# Patient Record
Sex: FEMALE | Race: WHITE | NOT HISPANIC OR LATINO
[De-identification: names, ages, dates, MRNs, and addresses within clinical notes are randomized per-mention and may not be internally consistent; named-entity substitution may affect disease eponyms.]

---
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
## 2023-05-15 ENCOUNTER — RX RENEWAL (OUTPATIENT)
Age: 18
End: 2023-05-15

## 2023-06-13 ENCOUNTER — APPOINTMENT (OUTPATIENT)
Dept: INTERNAL MEDICINE | Facility: CLINIC | Age: 18
End: 2023-06-13
Payer: COMMERCIAL

## 2023-06-13 VITALS
DIASTOLIC BLOOD PRESSURE: 78 MMHG | WEIGHT: 132 LBS | OXYGEN SATURATION: 99 % | HEIGHT: 66 IN | BODY MASS INDEX: 21.21 KG/M2 | SYSTOLIC BLOOD PRESSURE: 100 MMHG | HEART RATE: 61 BPM | TEMPERATURE: 98.5 F

## 2023-06-13 PROCEDURE — 99385 PREV VISIT NEW AGE 18-39: CPT

## 2023-06-13 NOTE — HISTORY OF PRESENT ILLNESS
[FreeTextEntry1] : Patient comes in to establish care and annual exam.\par \par  [de-identified] : TIMOTHY PAULINO is a 18 year F who comes in to establish care.\par Pt with hx of aquired hypothyroidism, amenorrhea, right breast fibroadenoma.\par Pt follows up with endocrine, breast surgery and will establish with Gyn. \par PT notes a hx of anxiety from freshman year of college. She also notes nausea and abdominal pain in 2021 and had bw done with Dr.Andrea Ramos for celiac that was positive and upper endoscopy done at that time was negative for celiac disease.  She notes recently she has noticed again nausea again with no abdominal pain but increased gas associated after eating certain foods.  She did see ENT a few months ago for jaw pain and had laryngoscopy which did show reflux and was advised to avoid certain foods.\par She does have family history maternal breast cancer at age 36 and follows regularly with breast surgery.  Patient states last breast ultrasound was done in the office with breast surgery last year.\par She had blood work done with endocrinology last year which showed elevated TSH and her levothyroxine was switched from 88 to 100 mcg and will see them again next month.\par Patient also notes inflamed lymph node behind the left ear and left side of the neck that occurs on and off.  Resolved, last occurred 2 weeks ago.\par Patient denies any cp, sob,abdominal pain, nausea, vomiting, palpitations, fever, chills, constipation, diarrhea.\par

## 2023-06-13 NOTE — ASSESSMENT
[FreeTextEntry1] : 1.health maintenance: Follow-up with GYN for initial Pap smear.\par Discussed blood work to obtain.\par Patient counseled regarding recommendations for vaccines, diet and exercise and all preventative screening.\par \par 2.acquired hypothyroidism: Recheck TFTs and follow-up with endocrinology regarding results and medication management.  Continue on levothyroxine 100 mcg on an empty stomach once daily.\par \par 3.fibroadenoma of the right breast: With family history of breast cancer, follow-up with breast surgery for repeat imaging.\par \par 4.vitamin D deficiency: Continue on vitamin D 1000 units daily, patient states she takes it infrequently.  Patient recently vegetarian we will check vitamin B12 level 2.\par \par 5.nausea: Discussed likely related to her acid reflux, start on Pepcid as needed, if no improvement of symptoms will need to go back to see GI.  Per patient upper endoscopy done in 2021.  Advised to keep GERD diet as well.

## 2023-06-13 NOTE — HEALTH RISK ASSESSMENT
[No] : In the past 12 months have you used drugs other than those required for medical reasons? No [0] : 2) Feeling down, depressed, or hopeless: Not at all (0) [Never] : Never [PHQ-2 Negative - No further assessment needed] : PHQ-2 Negative - No further assessment needed [I have developed a follow-up plan documented below in the note.] : I have developed a follow-up plan documented below in the note. [[id number]] : 0 [EyeExamDate] : 0120

## 2023-06-26 LAB
25(OH)D3 SERPL-MCNC: 31.7 NG/ML
ALBUMIN SERPL ELPH-MCNC: 4.8 G/DL
ALP BLD-CCNC: 48 U/L
ALT SERPL-CCNC: 9 U/L
ANION GAP SERPL CALC-SCNC: 12 MMOL/L
AST SERPL-CCNC: 15 U/L
BILIRUB SERPL-MCNC: 0.6 MG/DL
BUN SERPL-MCNC: 8 MG/DL
CALCIUM SERPL-MCNC: 10 MG/DL
CHLORIDE SERPL-SCNC: 102 MMOL/L
CO2 SERPL-SCNC: 24 MMOL/L
CREAT SERPL-MCNC: 0.69 MG/DL
EGFR: 127 ML/MIN/1.73M2
GLUCOSE SERPL-MCNC: 91 MG/DL
POTASSIUM SERPL-SCNC: 4.7 MMOL/L
PROT SERPL-MCNC: 7.3 G/DL
SODIUM SERPL-SCNC: 138 MMOL/L
T4 FREE SERPL-MCNC: 1.4 NG/DL
TSH SERPL-ACNC: 1.82 UIU/ML
VIT B12 SERPL-MCNC: 208 PG/ML

## 2023-07-03 ENCOUNTER — APPOINTMENT (OUTPATIENT)
Dept: ENDOCRINOLOGY | Facility: CLINIC | Age: 18
End: 2023-07-03

## 2023-07-11 ENCOUNTER — APPOINTMENT (OUTPATIENT)
Dept: BREAST CENTER | Facility: CLINIC | Age: 18
End: 2023-07-11
Payer: COMMERCIAL

## 2023-07-11 VITALS
HEIGHT: 66 IN | SYSTOLIC BLOOD PRESSURE: 99 MMHG | DIASTOLIC BLOOD PRESSURE: 64 MMHG | BODY MASS INDEX: 21.21 KG/M2 | WEIGHT: 132 LBS | HEART RATE: 67 BPM

## 2023-07-11 PROCEDURE — 99213 OFFICE O/P EST LOW 20 MIN: CPT

## 2023-07-11 NOTE — HISTORY OF PRESENT ILLNESS
[FreeTextEntry1] : This is a 18 year old  female who felt a lump in her right breast in 9/2021.  A core biopsy confirmed a fibroadenoma.  She no longer feels the lump.\par \par She is here with her mother.

## 2023-07-11 NOTE — PHYSICAL EXAM
[Normocephalic] : normocephalic [EOMI] : extra ocular movement intact [Sclera nonicteric] : sclera nonicteric [Supple] : supple [No Supraclavicular Adenopathy] : no supraclavicular adenopathy [Examined in the supine and seated position] : examined in the supine and seated position [No dominant masses] : no dominant masses in right breast  [No dominant masses] : no dominant masses left breast [No Nipple Retraction] : no left nipple retraction [No Nipple Discharge] : no left nipple discharge [No Axillary Lymphadenopathy] : no left axillary lymphadenopathy [Soft] : abdomen soft [Not Tender] : non-tender [de-identified] : Previously 1 cm mobile mass 10:00 N6 no longer palpable

## 2023-07-11 NOTE — DATA REVIEWED
[FreeTextEntry1] : Bilateral breast ultrasound ([facility]) 11/22/2021;  Right 10N7 1.3 cm hypoechoic nodule.  Follow-up in 6 months.

## 2023-07-25 ENCOUNTER — NON-APPOINTMENT (OUTPATIENT)
Age: 18
End: 2023-07-25

## 2023-07-28 ENCOUNTER — APPOINTMENT (OUTPATIENT)
Dept: OBGYN | Facility: CLINIC | Age: 18
End: 2023-07-28
Payer: COMMERCIAL

## 2023-07-28 VITALS
SYSTOLIC BLOOD PRESSURE: 108 MMHG | DIASTOLIC BLOOD PRESSURE: 68 MMHG | BODY MASS INDEX: 21.21 KG/M2 | HEIGHT: 66 IN | WEIGHT: 132 LBS

## 2023-07-28 DIAGNOSIS — N94.6 DYSMENORRHEA, UNSPECIFIED: ICD-10-CM

## 2023-07-28 PROCEDURE — 99385 PREV VISIT NEW AGE 18-39: CPT

## 2023-07-28 NOTE — PHYSICAL EXAM
[Appropriately responsive] : appropriately responsive [Alert] : alert [No Acute Distress] : no acute distress [No Lymphadenopathy] : no lymphadenopathy [Soft] : soft [Non-tender] : non-tender [Non-distended] : non-distended [No HSM] : No HSM [No Lesions] : no lesions [No Mass] : no mass [Oriented x3] : oriented x3 [FreeTextEntry6] : Fibroadenoma is not palpable.No adenopathy [Examination Of The Breasts] : a normal appearance [Normal] : normal [No Masses] : no breast masses were palpable

## 2023-07-28 NOTE — DISCUSSION/SUMMARY
[FreeTextEntry1] : Pap due age 18\par Gardasil in teens\par \par Acne/Dysmenorrhea and h/o ovarian cyst\par - pt has been using low dose OCP for these indications\par -careful discussion of long and shorterm risks d/w pt\par -Amanda as fourth generation progesterone has slightly higher risk of VTE\par -unsure if OCP has increase risk of fibroadenoma.  If such, would suggest trial off pill \par -I do not believe that she will have frequent recurrent cysts. Even this 4.8 cm cyst was incidental\par -non smoker, so I did renvew Amanda for 6 months\par \par Will reevaluate in 6 months

## 2023-07-28 NOTE — HISTORY OF PRESENT ILLNESS
[FreeTextEntry1] : 19 yo G0 for gyn care\par \par Breast biopsy next week for increasing fibroadenoma\par \par Pt has had OCP use since 2010 when she had a 4.8 cm ovarian cyst noted on CT at time of appy.\par Repeat US 10/22 showed resolution.\par Pt continued form lo loestrin to Amanda generic to Amanda. She has been on OCP for 18 months\par \par GYN:\par not sexually active\par \par SH:\par single, going to [employer], rising zohreh\par \par

## 2023-08-01 ENCOUNTER — NON-APPOINTMENT (OUTPATIENT)
Age: 18
End: 2023-08-01

## 2023-08-02 ENCOUNTER — RESULT REVIEW (OUTPATIENT)
Age: 18
End: 2023-08-02

## 2023-08-02 ENCOUNTER — APPOINTMENT (OUTPATIENT)
Dept: ULTRASOUND IMAGING | Facility: CLINIC | Age: 18
End: 2023-08-02
Payer: COMMERCIAL

## 2023-08-02 ENCOUNTER — OUTPATIENT (OUTPATIENT)
Dept: OUTPATIENT SERVICES | Facility: HOSPITAL | Age: 18
LOS: 1 days | End: 2023-08-02
Payer: COMMERCIAL

## 2023-08-02 DIAGNOSIS — N63.10 UNSPECIFIED LUMP IN THE RIGHT BREAST, UNSPECIFIED QUADRANT: ICD-10-CM

## 2023-08-02 DIAGNOSIS — Z90.49 ACQUIRED ABSENCE OF OTHER SPECIFIED PARTS OF DIGESTIVE TRACT: Chronic | ICD-10-CM

## 2023-08-02 PROCEDURE — 19285 PERQ DEV BREAST 1ST US IMAG: CPT | Mod: RT

## 2023-08-02 PROCEDURE — 19286 PERQ DEV BREAST ADD US IMAG: CPT

## 2023-08-02 PROCEDURE — 19286 PERQ DEV BREAST ADD US IMAG: CPT | Mod: RT

## 2023-08-02 PROCEDURE — 19285 PERQ DEV BREAST 1ST US IMAG: CPT

## 2023-08-02 RX ORDER — LEVOTHYROXINE SODIUM 125 MCG
1 TABLET ORAL
Qty: 0 | Refills: 0 | DISCHARGE

## 2023-08-02 NOTE — ASU PATIENT PROFILE, ADULT - FALL HARM RISK - UNIVERSAL INTERVENTIONS
Bed in lowest position, wheels locked, appropriate side rails in place/Call bell, personal items and telephone in reach/Instruct patient to call for assistance before getting out of bed or chair/Non-slip footwear when patient is out of bed/[place] to call system/Physically safe environment - no spills, clutter or unnecessary equipment/Purposeful Proactive Rounding/Room/bathroom lighting operational, light cord in reach

## 2023-08-03 ENCOUNTER — APPOINTMENT (OUTPATIENT)
Dept: BREAST CENTER | Facility: HOSPITAL | Age: 18
End: 2023-08-03

## 2023-08-03 ENCOUNTER — RESULT REVIEW (OUTPATIENT)
Age: 18
End: 2023-08-03

## 2023-08-03 ENCOUNTER — OUTPATIENT (OUTPATIENT)
Dept: INPATIENT UNIT | Facility: HOSPITAL | Age: 18
LOS: 1 days | Discharge: HOME | End: 2023-08-03
Payer: COMMERCIAL

## 2023-08-03 ENCOUNTER — TRANSCRIPTION ENCOUNTER (OUTPATIENT)
Age: 18
End: 2023-08-03

## 2023-08-03 VITALS
HEIGHT: 66 IN | SYSTOLIC BLOOD PRESSURE: 101 MMHG | DIASTOLIC BLOOD PRESSURE: 60 MMHG | TEMPERATURE: 99 F | RESPIRATION RATE: 16 BRPM | OXYGEN SATURATION: 100 % | WEIGHT: 132.06 LBS | HEART RATE: 67 BPM

## 2023-08-03 VITALS
TEMPERATURE: 97 F | HEART RATE: 90 BPM | SYSTOLIC BLOOD PRESSURE: 103 MMHG | RESPIRATION RATE: 16 BRPM | OXYGEN SATURATION: 100 % | DIASTOLIC BLOOD PRESSURE: 66 MMHG

## 2023-08-03 DIAGNOSIS — Z90.49 ACQUIRED ABSENCE OF OTHER SPECIFIED PARTS OF DIGESTIVE TRACT: Chronic | ICD-10-CM

## 2023-08-03 DIAGNOSIS — G89.18 OTHER ACUTE POSTPROCEDURAL PAIN: ICD-10-CM

## 2023-08-03 DIAGNOSIS — N63.10 UNSPECIFIED LUMP IN THE RIGHT BREAST, UNSPECIFIED QUADRANT: ICD-10-CM

## 2023-08-03 LAB — HCG UR QL: NEGATIVE — SIGNIFICANT CHANGE UP

## 2023-08-03 PROCEDURE — 19126 EXCISION ADDL BREAST LESION: CPT

## 2023-08-03 PROCEDURE — 19125 EXCISION BREAST LESION: CPT | Mod: RT

## 2023-08-03 PROCEDURE — 76098 X-RAY EXAM SURGICAL SPECIMEN: CPT

## 2023-08-03 PROCEDURE — 76098 X-RAY EXAM SURGICAL SPECIMEN: CPT | Mod: 26,76

## 2023-08-03 PROCEDURE — 88305 TISSUE EXAM BY PATHOLOGIST: CPT

## 2023-08-03 PROCEDURE — 81025 URINE PREGNANCY TEST: CPT

## 2023-08-03 PROCEDURE — 88305 TISSUE EXAM BY PATHOLOGIST: CPT | Mod: 26

## 2023-08-03 RX ORDER — DROSPIRENONE AND ETHINYL ESTRADIOL 0.03MG-3MG
1 KIT ORAL
Refills: 0 | DISCHARGE

## 2023-08-03 RX ORDER — HYDROMORPHONE HYDROCHLORIDE 2 MG/ML
0.5 INJECTION INTRAMUSCULAR; INTRAVENOUS; SUBCUTANEOUS
Refills: 0 | Status: DISCONTINUED | OUTPATIENT
Start: 2023-08-03 | End: 2023-08-03

## 2023-08-03 RX ORDER — ONDANSETRON 8 MG/1
4 TABLET, FILM COATED ORAL ONCE
Refills: 0 | Status: COMPLETED | OUTPATIENT
Start: 2023-08-03 | End: 2023-08-03

## 2023-08-03 RX ORDER — SODIUM CHLORIDE 9 MG/ML
1000 INJECTION, SOLUTION INTRAVENOUS
Refills: 0 | Status: DISCONTINUED | OUTPATIENT
Start: 2023-08-03 | End: 2023-08-03

## 2023-08-03 RX ORDER — LEVOTHYROXINE SODIUM 125 MCG
1 TABLET ORAL
Refills: 0 | DISCHARGE

## 2023-08-03 RX ORDER — OXYCODONE HYDROCHLORIDE 5 MG/1
5 TABLET ORAL ONCE
Refills: 0 | Status: DISCONTINUED | OUTPATIENT
Start: 2023-08-03 | End: 2023-08-03

## 2023-08-03 RX ADMIN — ONDANSETRON 4 MILLIGRAM(S): 8 TABLET, FILM COATED ORAL at 19:04

## 2023-08-03 RX ADMIN — OXYCODONE HYDROCHLORIDE 5 MILLIGRAM(S): 5 TABLET ORAL at 16:48

## 2023-08-03 RX ADMIN — OXYCODONE HYDROCHLORIDE 5 MILLIGRAM(S): 5 TABLET ORAL at 19:16

## 2023-08-03 RX ADMIN — HYDROMORPHONE HYDROCHLORIDE 0.5 MILLIGRAM(S): 2 INJECTION INTRAMUSCULAR; INTRAVENOUS; SUBCUTANEOUS at 16:52

## 2023-08-03 RX ADMIN — HYDROMORPHONE HYDROCHLORIDE 0.5 MILLIGRAM(S): 2 INJECTION INTRAMUSCULAR; INTRAVENOUS; SUBCUTANEOUS at 19:16

## 2023-08-03 NOTE — BRIEF OPERATIVE NOTE - NSICDXBRIEFPOSTOP_GEN_ALL_CORE_FT
POST-OP DIAGNOSIS:  Mass of right breast 03-Aug-2023 16:32:23  Delilah Yen  Fibroadenoma, right 03-Aug-2023 16:32:33  Delilah Yen

## 2023-08-03 NOTE — BRIEF OPERATIVE NOTE - NSICDXBRIEFPROCEDURE_GEN_ALL_CORE_FT
PROCEDURES:  Excisional biopsy of lesion of breast using magnetic seed marker 03-Aug-2023 16:31:33  Delilah Yen  Excisional biopsy of lesion of breast using magnetic seed marker 03-Aug-2023 16:31:46  Delilah Yen

## 2023-08-03 NOTE — BRIEF OPERATIVE NOTE - NSICDXBRIEFPREOP_GEN_ALL_CORE_FT
PRE-OP DIAGNOSIS:  Mass of right breast 03-Aug-2023 16:31:59  Delilah Yen  Fibroadenoma, right 03-Aug-2023 16:32:11  Delilah Yen

## 2023-08-03 NOTE — ASU DISCHARGE PLAN (ADULT/PEDIATRIC) - CARE PROVIDER_API CALL
Delilah Yen  Surgery  [address], [place] [address]  Phone: [phone]  Fax: [phone]  Follow Up Time:

## 2023-08-04 ENCOUNTER — NON-APPOINTMENT (OUTPATIENT)
Age: 18
End: 2023-08-04

## 2023-08-07 DIAGNOSIS — Z30.41 ENCOUNTER FOR SURVEILLANCE OF CONTRACEPTIVE PILLS: ICD-10-CM

## 2023-08-08 ENCOUNTER — TRANSCRIPTION ENCOUNTER (OUTPATIENT)
Age: 18
End: 2023-08-08

## 2023-08-08 LAB — SURGICAL PATHOLOGY STUDY: SIGNIFICANT CHANGE UP

## 2023-08-10 DIAGNOSIS — D24.1 BENIGN NEOPLASM OF RIGHT BREAST: ICD-10-CM

## 2023-08-10 DIAGNOSIS — N63.10 UNSPECIFIED LUMP IN THE RIGHT BREAST, UNSPECIFIED QUADRANT: ICD-10-CM

## 2023-08-10 DIAGNOSIS — E06.3 AUTOIMMUNE THYROIDITIS: ICD-10-CM

## 2023-08-15 ENCOUNTER — APPOINTMENT (OUTPATIENT)
Dept: BREAST CENTER | Facility: CLINIC | Age: 18
End: 2023-08-15
Payer: COMMERCIAL

## 2023-08-15 VITALS
BODY MASS INDEX: 21.21 KG/M2 | HEART RATE: 96 BPM | WEIGHT: 132 LBS | DIASTOLIC BLOOD PRESSURE: 67 MMHG | HEIGHT: 66 IN | SYSTOLIC BLOOD PRESSURE: 96 MMHG

## 2023-08-15 DIAGNOSIS — R21 RASH AND OTHER NONSPECIFIC SKIN ERUPTION: ICD-10-CM

## 2023-08-15 DIAGNOSIS — Z09 ENCOUNTER FOR FOLLOW-UP EXAMINATION AFTER COMPLETED TREATMENT FOR CONDITIONS OTHER THAN MALIGNANT NEOPLASM: ICD-10-CM

## 2023-08-15 PROCEDURE — 99024 POSTOP FOLLOW-UP VISIT: CPT

## 2023-08-15 RX ORDER — OXYCODONE 5 MG/1
5 TABLET ORAL EVERY 4 HOURS
Qty: 6 | Refills: 0 | Status: DISCONTINUED | COMMUNITY
Start: 2023-08-03 | End: 2023-08-15

## 2023-08-15 NOTE — PHYSICAL EXAM
[de-identified] : Radial incision 9:00 healing well. Suture end removed.  Scattered rash over right breast/ upper arm in pattern of skin prep solution

## 2023-08-15 NOTE — HISTORY OF PRESENT ILLNESS
[FreeTextEntry1] : This is a 18 year old  female who felt a lump in her right breast in 9/2021.  A core biopsy confirmed a fibroadenoma.  Follow-up ultrasound showed the lesion to have increased in size.  There was also an adjacent new nodule.  She underwent magseed localized excision of both lesions on 8/3/2023.  She had little pain, but has developed a rash on her breast and has had headaches.  She is here with her mother.

## 2023-08-15 NOTE — DATA REVIEWED
[FreeTextEntry1] : Bilateral breast ultrasound ([facility]) 7/21/2023  Right 10N7 [id number] mm hypoechoic mass with clip, increased with irregular margins.  7 ,mm inferior is a second hypoechoic mass 6x6x3 mm, rec biopsies.  Surgical pathology 8/3/2023:  Right 10:00= 4 mm fibroadenoma                                                 Right 10 lateral= fibroadenoma (1.9 cm)

## 2023-11-17 PROBLEM — N83.209 UNSPECIFIED OVARIAN CYST, UNSPECIFIED SIDE: Chronic | Status: ACTIVE | Noted: 2023-08-02

## 2023-11-17 PROBLEM — D24.1 BENIGN NEOPLASM OF RIGHT BREAST: Chronic | Status: ACTIVE | Noted: 2023-08-02

## 2024-01-05 ENCOUNTER — APPOINTMENT (OUTPATIENT)
Dept: OBGYN | Facility: CLINIC | Age: 19
End: 2024-01-05

## 2024-01-09 ENCOUNTER — APPOINTMENT (OUTPATIENT)
Dept: ENDOCRINOLOGY | Facility: CLINIC | Age: 19
End: 2024-01-09
Payer: COMMERCIAL

## 2024-01-09 VITALS
HEIGHT: 66 IN | HEART RATE: 64 BPM | OXYGEN SATURATION: 96 % | BODY MASS INDEX: 21.21 KG/M2 | WEIGHT: 132 LBS | DIASTOLIC BLOOD PRESSURE: 70 MMHG | SYSTOLIC BLOOD PRESSURE: 100 MMHG

## 2024-01-09 DIAGNOSIS — N91.2 AMENORRHEA, UNSPECIFIED: ICD-10-CM

## 2024-01-09 DIAGNOSIS — E55.9 VITAMIN D DEFICIENCY, UNSPECIFIED: ICD-10-CM

## 2024-01-09 DIAGNOSIS — E06.3 AUTOIMMUNE THYROIDITIS: ICD-10-CM

## 2024-01-09 LAB
25(OH)D3 SERPL-MCNC: 26.1 NG/ML
T4 FREE SERPL-MCNC: 1.9 NG/DL
TSH SERPL-ACNC: 0.95 UIU/ML

## 2024-01-09 PROCEDURE — 99214 OFFICE O/P EST MOD 30 MIN: CPT

## 2024-03-11 DIAGNOSIS — R63.4 ABNORMAL WEIGHT LOSS: ICD-10-CM

## 2024-03-13 ENCOUNTER — RX RENEWAL (OUTPATIENT)
Age: 19
End: 2024-03-13

## 2024-03-13 PROBLEM — R63.4 ABNORMAL WEIGHT LOSS: Status: ACTIVE | Noted: 2017-07-26

## 2024-03-14 LAB
BASOPHILS # BLD AUTO: 0.03 K/UL
BASOPHILS NFR BLD AUTO: 0.5 %
EOSINOPHIL # BLD AUTO: 0.03 K/UL
EOSINOPHIL NFR BLD AUTO: 0.5 %
HCT VFR BLD CALC: 38.6 %
HGB BLD-MCNC: 12.4 G/DL
IMM GRANULOCYTES NFR BLD AUTO: 0.4 %
LYMPHOCYTES # BLD AUTO: 3.02 K/UL
LYMPHOCYTES NFR BLD AUTO: 55 %
MAN DIFF?: NORMAL
MCHC RBC-ENTMCNC: 28.1 PG
MCHC RBC-ENTMCNC: 32.1 GM/DL
MCV RBC AUTO: 87.3 FL
MONOCYTES # BLD AUTO: 0.48 K/UL
MONOCYTES NFR BLD AUTO: 8.7 %
NEUTROPHILS # BLD AUTO: 1.91 K/UL
NEUTROPHILS NFR BLD AUTO: 34.9 %
PLATELET # BLD AUTO: 275 K/UL
RBC # BLD: 4.42 M/UL
RBC # FLD: 13.3 %
WBC # FLD AUTO: 5.49 K/UL

## 2024-03-15 LAB
IGF-1 INTERP: NORMAL
IGF-I BLD-MCNC: 181 NG/ML

## 2024-03-21 LAB
METANEPHRINE, PL: <25 PG/ML
NORMETANEPHRINE, PL: 35.2 PG/ML

## 2024-04-01 LAB
FERRITIN SERPL-MCNC: 19 NG/ML
FOLATE SERPL-MCNC: 14.3 NG/ML
IRON SATN MFR SERPL: 16 %
IRON SERPL-MCNC: 65 UG/DL
TIBC SERPL-MCNC: 412 UG/DL
TRANSFERRIN SERPL-MCNC: 323 MG/DL
UIBC SERPL-MCNC: 347 UG/DL
VIT B12 SERPL-MCNC: 248 PG/ML

## 2024-05-22 RX ORDER — DROSPIRENONE AND ETHINYL ESTRADIOL 0.02-3(28)
3-0.02 KIT ORAL
Qty: 84 | Refills: 0 | Status: ACTIVE | COMMUNITY
Start: 2024-03-13 | End: 1900-01-01

## 2024-06-05 ENCOUNTER — RX RENEWAL (OUTPATIENT)
Age: 19
End: 2024-06-05

## 2024-06-05 RX ORDER — LEVOTHYROXINE SODIUM 0.1 MG/1
100 TABLET ORAL
Qty: 90 | Refills: 3 | Status: ACTIVE | COMMUNITY
Start: 2022-06-07 | End: 1900-01-01

## 2024-06-14 ENCOUNTER — APPOINTMENT (OUTPATIENT)
Dept: INTERNAL MEDICINE | Facility: CLINIC | Age: 19
End: 2024-06-14
Payer: COMMERCIAL

## 2024-06-14 VITALS
BODY MASS INDEX: 22.5 KG/M2 | HEIGHT: 66 IN | WEIGHT: 140 LBS | TEMPERATURE: 98.5 F | OXYGEN SATURATION: 99 % | HEART RATE: 74 BPM | SYSTOLIC BLOOD PRESSURE: 104 MMHG | DIASTOLIC BLOOD PRESSURE: 72 MMHG

## 2024-06-14 DIAGNOSIS — E03.9 HYPOTHYROIDISM, UNSPECIFIED: ICD-10-CM

## 2024-06-14 DIAGNOSIS — N63.10 UNSPECIFIED LUMP IN THE RIGHT BREAST, UNSPECIFIED QUADRANT: ICD-10-CM

## 2024-06-14 DIAGNOSIS — D24.1 BENIGN NEOPLASM OF RIGHT BREAST: ICD-10-CM

## 2024-06-14 DIAGNOSIS — L70.0 ACNE VULGARIS: ICD-10-CM

## 2024-06-14 DIAGNOSIS — Z00.00 ENCOUNTER FOR GENERAL ADULT MEDICAL EXAMINATION W/OUT ABNORMAL FINDINGS: ICD-10-CM

## 2024-06-14 LAB
ALBUMIN SERPL ELPH-MCNC: 4.4 G/DL
ALP BLD-CCNC: 52 U/L
ALT SERPL-CCNC: 14 U/L
ANION GAP SERPL CALC-SCNC: 13 MMOL/L
AST SERPL-CCNC: 16 U/L
BILIRUB SERPL-MCNC: 0.3 MG/DL
BUN SERPL-MCNC: 13 MG/DL
CALCIUM SERPL-MCNC: 9.7 MG/DL
CHLORIDE SERPL-SCNC: 103 MMOL/L
CO2 SERPL-SCNC: 22 MMOL/L
CREAT SERPL-MCNC: 0.72 MG/DL
EGFR: 122 ML/MIN/1.73M2
GLUCOSE SERPL-MCNC: 93 MG/DL
POTASSIUM SERPL-SCNC: 4.5 MMOL/L
PROT SERPL-MCNC: 7.3 G/DL
SODIUM SERPL-SCNC: 137 MMOL/L
T4 FREE SERPL-MCNC: 1.6 NG/DL
TSH SERPL-ACNC: 2.75 UIU/ML

## 2024-06-14 PROCEDURE — G2211 COMPLEX E/M VISIT ADD ON: CPT | Mod: NC,1L

## 2024-06-14 PROCEDURE — 99395 PREV VISIT EST AGE 18-39: CPT

## 2024-06-14 RX ORDER — BENZOYL PEROXIDE 10 %
10 GEL (GRAM) TOPICAL DAILY
Qty: 1 | Refills: 0 | Status: ACTIVE | COMMUNITY
Start: 2024-06-14 | End: 1900-01-01

## 2024-06-14 RX ORDER — VITAMIN E (DL,TOCOPHERYL ACET) 180 MG
CAPSULE ORAL
Refills: 0 | Status: ACTIVE | COMMUNITY

## 2024-06-14 RX ORDER — MULTIVIT-MIN/IRON/FOLIC ACID/K 18-600-40
50 MCG CAPSULE ORAL
Refills: 0 | Status: ACTIVE | COMMUNITY

## 2024-06-14 RX ORDER — DROSPIRENONE AND ETHINYL ESTRADIOL 0.02-3(28)
3-0.02 KIT ORAL
Qty: 90 | Refills: 2 | Status: DISCONTINUED | COMMUNITY
Start: 2023-08-07 | End: 2024-06-14

## 2024-06-14 NOTE — HEALTH RISK ASSESSMENT
[No] : In the past 12 months have you used drugs other than those required for medical reasons? No [0] : 2) Feeling down, depressed, or hopeless: Not at all (0) [Never] : Never [PHQ-2 Negative - No further assessment needed] : PHQ-2 Negative - No further assessment needed [I have developed a follow-up plan documented below in the note.] : I have developed a follow-up plan documented below in the note. [With Family] : lives with family [Employed] : employed [Student] : student [College] : College [Single] : single [Audit-CScore] : 0 [[id number]] : 0 [Sexually Active] : not sexually active [FreeTextEntry2] : Works in summer at [employer], Will be senior at [employer]

## 2024-06-14 NOTE — ASSESSMENT
[FreeTextEntry1] : 1.health maintenance: Follow-up with GYN for initial Pap smear. Discussed blood work to obtain. Patient counseled regarding recommendations for vaccines, diet and exercise and all preventative screening.  2.acquired hypothyroidism: Recheck TFTs and follow-up with endocrinology regarding results and medication management.  Continue on levothyroxine 100 mcg on an empty stomach once daily.  3.fibroadenoma of the right breast: Status post excision with benign pathology.  4.vitamin D deficiency: Continue on vitamin D 2000 units daily.  P  5.perioral dermatitis with history of acne: Advised starting on benzyl peroxide gel to affected area and to follow-up with dermatology if no resolution.

## 2024-06-14 NOTE — HISTORY OF PRESENT ILLNESS
[FreeTextEntry1] : CPE [de-identified] : TIMOTHY PAULINO is a 19 year F who comes in for an annual physical exam. Pt with hx of acquired hypothyroidism, amenorrhea, right breast fibroadenoma s/p excision 8/23. Pt follows up with endocrine, breast surgery and has seen GYN too. She did see breast surgery and had excision of breast fibroadenoma.  She notes weight gain of about 8 lbs over the last year, has not changed eating patter or activity level.  Pt has noticed lower chin acne for 3 months, changed her moisturizer but then switched back to original once flare occurred. She did use Accutane in 2020 for 6 months that helped for the last 4 yrs.  Patient denies any cp, sob, abdominal pain, nausea, vomiting, palpitations, fever, chills, constipation, diarrhea.

## 2024-07-16 ENCOUNTER — APPOINTMENT (OUTPATIENT)
Dept: BREAST CENTER | Facility: CLINIC | Age: 19
End: 2024-07-16

## 2024-07-22 ENCOUNTER — APPOINTMENT (OUTPATIENT)
Dept: ENDOCRINOLOGY | Facility: CLINIC | Age: 19
End: 2024-07-22
Payer: COMMERCIAL

## 2024-07-22 VITALS
DIASTOLIC BLOOD PRESSURE: 62 MMHG | SYSTOLIC BLOOD PRESSURE: 112 MMHG | HEART RATE: 80 BPM | OXYGEN SATURATION: 97 % | WEIGHT: 136 LBS

## 2024-07-22 DIAGNOSIS — E03.9 HYPOTHYROIDISM, UNSPECIFIED: ICD-10-CM

## 2024-07-22 DIAGNOSIS — E55.9 VITAMIN D DEFICIENCY, UNSPECIFIED: ICD-10-CM

## 2024-07-22 DIAGNOSIS — E06.3 AUTOIMMUNE THYROIDITIS: ICD-10-CM

## 2024-07-22 DIAGNOSIS — N91.2 AMENORRHEA, UNSPECIFIED: ICD-10-CM

## 2024-07-22 PROCEDURE — G2211 COMPLEX E/M VISIT ADD ON: CPT

## 2024-07-22 PROCEDURE — 99214 OFFICE O/P EST MOD 30 MIN: CPT

## 2024-07-23 NOTE — HISTORY OF PRESENT ILLNESS
[FreeTextEntry1] : 20 y/o female here for management of hypothyroidism, primary amenorrhea, and significant weight loss.  Initially was diagnosed with hypothyroidism in 2017, at age 13 Work up done due to weight loss (severe). May have lost from 100 to 85 lbs Periods started naturally after starting thyroid medication. Menarche at age 13 She had normal menses every month after onset of periods She started OCP in Feb 2021 after being diagnosed with cysts in her ovaries Continues to take OCP at this time. She continues to see OB/GYN who does pelvic ultrasounds. Current OCP: Amanda Breast development was delayed and once she had puberty at age 13 with hypothyroidism treatment No h/o thyroid biopsy. Had a thyroid ultrasound 11/2018 which was consistent with thyroiditis at the time Starting getting menses after starting on LT4 - thinks she was 13 years. Sees Dr. Silva for GYN  Taking Levothyroxine 100 mcg, good compliance  Takes on empty stomach. No complaints.  She also lost 20 lbs in 2021 (first year of college) and saw GI and diagnosed with irritable bowel syndrome and anxiety. Has frequent nausea with food at school. Sometimes has constipation so she takes Fiber gummies. She has abdominal pain sometimes (not in relation to meals, but most of the time). Not provoked by anxious events. Time improves her symptoms. TUMS or PEPCID does not help No diarrhea or systemic rashes No diaphoresis with these episodes.  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  No tobacco or alcohol. wants to be an  Meds: OCP - Amanda FH: Mom with breast cancer (age 36), father with hypothyroidism. No heart disease in the family  Mom did not have BRCA gene

## 2024-07-23 NOTE — ASSESSMENT
[Levothyroxine] : The patient was instructed to take Levothyroxine on an empty stomach, separate from vitamins, and wait at least 30 minutes before eating [FreeTextEntry1] : 18 y/o female here for hypothyroidism due to Hashimoto's'  Hypothyroidism - Due to Hashimoto's thyroiditis (initially presented with significant wt loss at age 13) - Continue Levothyroxine 100 mcg - biochemically and clinically euthyroid - no plans for pregnancy, pt aware of higher Lt4 dosing in pregnancy and to notify me if she is planning or becomes pregnant  Amenorrhea - resolved coincided with hypothyroidism diagnosis. Menses started with hypothyroid treatment, then was placed on OCP - Had normal pelvic ultrasound, h/o cysts per history - Currently on OCP, follows with GYN - has been on Amanda, getting menses regularly, LMP  mid 6/2024  Vitamin D Deficiency - H/o Vit D deficiency - continue supplement as needed  RTC 6 months.

## 2024-07-23 NOTE — HISTORY OF PRESENT ILLNESS
[FreeTextEntry1] : 18 y/o female here for management of hypothyroidism, primary amenorrhea, and significant weight loss.  Initially was diagnosed with hypothyroidism in 2017, at age 13 Work up done due to weight loss (severe). May have lost from 100 to 85 lbs Periods started naturally after starting thyroid medication. Menarche at age 13 She had normal menses every month after onset of periods She started OCP in Feb 2021 after being diagnosed with cysts in her ovaries Continues to take OCP at this time. She continues to see OB/GYN who does pelvic ultrasounds. Current OCP: Amanda Breast development was delayed and once she had puberty at age 13 with hypothyroidism treatment No h/o thyroid biopsy. Had a thyroid ultrasound 11/2018 which was consistent with thyroiditis at the time Starting getting menses after starting on LT4 - thinks she was 13 years. Sees Dr. Silva for GYN  Taking Levothyroxine 100 mcg, good compliance  Takes on empty stomach. No complaints.  She also lost 20 lbs in 2021 (first year of college) and saw GI and diagnosed with irritable bowel syndrome and anxiety. Has frequent nausea with food at school. Sometimes has constipation so she takes Fiber gummies. She has abdominal pain sometimes (not in relation to meals, but most of the time). Not provoked by anxious events. Time improves her symptoms. TUMS or PEPCID does not help No diarrhea or systemic rashes No diaphoresis with these episodes.  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  No tobacco or alcohol. wants to be an  Meds: OCP - Amanda FH: Mom with breast cancer (age 36), father with hypothyroidism. No heart disease in the family  Mom did not have BRCA gene

## 2024-07-27 ENCOUNTER — RX RENEWAL (OUTPATIENT)
Age: 19
End: 2024-07-27

## 2024-08-20 ENCOUNTER — APPOINTMENT (OUTPATIENT)
Dept: OBGYN | Facility: CLINIC | Age: 19
End: 2024-08-20
Payer: COMMERCIAL

## 2024-08-20 VITALS
HEIGHT: 66 IN | SYSTOLIC BLOOD PRESSURE: 110 MMHG | BODY MASS INDEX: 22.34 KG/M2 | DIASTOLIC BLOOD PRESSURE: 58 MMHG | WEIGHT: 139 LBS

## 2024-08-20 DIAGNOSIS — Z01.419 ENCOUNTER FOR GYNECOLOGICAL EXAMINATION (GENERAL) (ROUTINE) W/OUT ABNORMAL FINDINGS: ICD-10-CM

## 2024-08-20 PROCEDURE — 99395 PREV VISIT EST AGE 18-39: CPT

## 2024-08-23 PROBLEM — Z01.419 WELL FEMALE EXAM WITH ROUTINE GYNECOLOGICAL EXAM: Status: ACTIVE | Noted: 2024-08-23

## 2024-08-23 NOTE — DISCUSSION/SUMMARY
[FreeTextEntry1] : Health Maintenance: -Pap post-poned after discussion. Pt declining as she has never been sexually active. Next year.  Fibroadenoma; right breast biopsy c/w fibroadenoma Will follow  Dysmenorrhea: pt stopped Amanda recently Has had regular menses Aware that we will restart if needed for contraception NSAIDs d/w pt  RTO 1 yr

## 2024-08-23 NOTE — PHYSICAL EXAM
[Appropriately responsive] : appropriately responsive [Alert] : alert [No Acute Distress] : no acute distress [No Lymphadenopathy] : no lymphadenopathy [Soft] : soft [Non-tender] : non-tender [Non-distended] : non-distended [No HSM] : No HSM [No Lesions] : no lesions [No Mass] : no mass [Oriented x3] : oriented x3 [Examination Of The Breasts] : a normal appearance [___cm] : a ~M [unfilled] ~Ucm superior lateral quadrant mass was palpated [No Masses] : no breast masses were palpable [Labia Majora] : normal [Labia Minora] : normal [Normal] : normal [Uterine Adnexae] : normal [FreeTextEntry6] : Right sided mobile rubbery nodule RUOQ <3x2 cm

## 2024-08-23 NOTE — HISTORY OF PRESENT ILLNESS
[FreeTextEntry1] : 17 yo G0 for gyn care  Breast biopsy for increasing fibroadenoma - 8/23 benign fibroadenoma  Pt has had OCP use since 2010 when she had a 4.8 cm ovarian cyst noted on CT at time of appy. Repeat US 10/22 showed resolution. Pt continued form lo loestrin to Amanda generic to Amanda. She has been on OCP for 18 months  GYN: not sexually active  SH: single, going to [place], [place]

## 2024-08-23 NOTE — HISTORY OF PRESENT ILLNESS
[FreeTextEntry1] : 17 yo G0 for gyn care  Breast biopsy for increasing fibroadenoma - 8/23 benign fibroadenoma  Pt has had OCP use since 2010 when she had a 4.8 cm ovarian cyst noted on CT at time of appy. Repeat US 10/22 showed resolution. Pt continued form lo loestrin to Amanda generic to Amanda. She has been on OCP for 18 months  GYN: not sexually active  SH: single, going to [place], [place]

## 2025-02-07 ENCOUNTER — APPOINTMENT (OUTPATIENT)
Age: 20
End: 2025-02-07

## 2025-03-06 ENCOUNTER — NON-APPOINTMENT (OUTPATIENT)
Age: 20
End: 2025-03-06

## 2025-03-06 ENCOUNTER — TRANSCRIPTION ENCOUNTER (OUTPATIENT)
Age: 20
End: 2025-03-06

## 2025-03-26 ENCOUNTER — NON-APPOINTMENT (OUTPATIENT)
Age: 20
End: 2025-03-26

## 2025-03-27 ENCOUNTER — APPOINTMENT (OUTPATIENT)
Dept: RHEUMATOLOGY | Facility: CLINIC | Age: 20
End: 2025-03-27
Payer: COMMERCIAL

## 2025-03-27 ENCOUNTER — NON-APPOINTMENT (OUTPATIENT)
Age: 20
End: 2025-03-27

## 2025-03-27 VITALS
OXYGEN SATURATION: 98 % | BODY MASS INDEX: 20.89 KG/M2 | HEART RATE: 103 BPM | WEIGHT: 130 LBS | SYSTOLIC BLOOD PRESSURE: 108 MMHG | TEMPERATURE: 98 F | HEIGHT: 66 IN | DIASTOLIC BLOOD PRESSURE: 77 MMHG

## 2025-03-27 DIAGNOSIS — R23.1 PALLOR: ICD-10-CM

## 2025-03-27 DIAGNOSIS — E06.3 AUTOIMMUNE THYROIDITIS: ICD-10-CM

## 2025-03-27 DIAGNOSIS — M35.7 HYPERMOBILITY SYNDROME: ICD-10-CM

## 2025-03-27 PROCEDURE — 99214 OFFICE O/P EST MOD 30 MIN: CPT

## 2025-03-27 PROCEDURE — G2211 COMPLEX E/M VISIT ADD ON: CPT | Mod: NC

## 2025-04-01 LAB
ANTI-BETA2 GLYCOPROTEIN 1 IGG CONCENTRATION (ELFA): 1.3 U/ML
ANTI-BETA2 GLYCOPROTEIN 1 IGM CONCENTRATION (ELFA): <2.4 U/ML
ANTI-CARDIOLIPIN IGG CONCENTRATION (ELFA): 2.4 GPL
ANTI-CARDIOLIPIN IGM CONCENTRATION (ELFA): 1.1 MPL
ANTI-CENP IGG CONCENTRATION (ELFA): 0.5 U/ML
ANTI-CYCLIC CITRULLINATED PEPTIDE IGG CONCENTRATION (ELFA): 1.4 U/ML
ANTI-DOUBLE-STRANDED DNA IGG CONCENTRATION (ELISA): 31.43 IU/ML
ANTI-JO-1 IGG CONCENTRATION (ELFA): <0.3 U/ML
ANTI-NUCLEAR ANTIBODIES - CYTOPLASMIC PATTERN: NORMAL
ANTI-NUCLEAR ANTIBODIES - PRIMARY NUCLEAR PATTERN: NORMAL
ANTI-NUCLEAR ANTIBODIES - PRIMARY PATTERN TITER (IFA): ABNORMAL
ANTI-NUCLEAR ANTIBODIES IGG CONCENTRATION (ELISA): 27.89 UNITS
ANTI-RA33 IGA CONCENTRATION (ELFA): 0.5 U/ML
ANTI-RA33 IGG CONCENTRATION (ELFA): 6.1 U/ML
ANTI-RA33 IGM CONCENTRATION (ELFA): <23 U/ML
ANTI-RNA POL III IGG CONCENTRATION (ELFA): <0.7 U/ML
ANTI-RNP70 IGG CONCENTRATION (ELFA): 1.2 U/ML
ANTI-RO52 IGG CONCENTRATION (ELFA): 0.4 U/ML
ANTI-RO60 IGG CONCENTRATION (ELFA): <0.4 U/ML
ANTI-SCL-70 IGG CONCENTRATION (ELFA): 0.7 U/ML
ANTI-SMITH IGG CONCENTRATION (ELFA): <0.7 U/ML
ANTI-SS-B (LA) IGG CONCENTRATION (ELFA): 0.5 U/ML
ANTI-THYROGLOBULIN IGG CONCENTRATION (ELFA): 916 IU/ML
ANTI-THYROID PEROXIDASE IGG CONCENTRATION (ELFA): 1028 IU/ML
ANTI-U1RNP IGG CONCENTRATION (ELFA): 1.1 U/ML
AVISE LUPUS INDEX: -1
AVISE LUPUS RESULT: NEGATIVE
B-LYMPHOCYTE-BOUND C4D (BC4D) LEVEL (FC): 15
ERYTHROCYTE-BOUND C4D (EC4D) LEVEL (FC): 5
RHEUMATOID FACTOR (IGA) CONCENTRATION (ELFA): 0.7 IU/ML
RHEUMATOID FACTOR (IGM) CONCENTRATION (ELFA): 1 IU/ML
TC4D (FC): 192
TIGG (FC): 150
TIGM (FC): <187

## 2025-04-08 DIAGNOSIS — R79.89 OTHER SPECIFIED ABNORMAL FINDINGS OF BLOOD CHEMISTRY: ICD-10-CM

## 2025-04-17 ENCOUNTER — TRANSCRIPTION ENCOUNTER (OUTPATIENT)
Age: 20
End: 2025-04-17

## 2025-04-17 LAB — TSH SERPL-ACNC: 3.61 UIU/ML

## 2025-06-18 ENCOUNTER — APPOINTMENT (OUTPATIENT)
Dept: INTERNAL MEDICINE | Facility: CLINIC | Age: 20
End: 2025-06-18
Payer: COMMERCIAL

## 2025-06-18 VITALS
WEIGHT: 142 LBS | HEART RATE: 89 BPM | TEMPERATURE: 97.8 F | DIASTOLIC BLOOD PRESSURE: 62 MMHG | SYSTOLIC BLOOD PRESSURE: 100 MMHG | HEIGHT: 66 IN | OXYGEN SATURATION: 98 % | BODY MASS INDEX: 22.82 KG/M2

## 2025-06-18 PROBLEM — R11.0 NAUSEA: Status: ACTIVE | Noted: 2025-06-18

## 2025-06-18 PROCEDURE — 99395 PREV VISIT EST AGE 18-39: CPT

## 2025-06-18 PROCEDURE — 99213 OFFICE O/P EST LOW 20 MIN: CPT | Mod: 25

## 2025-06-25 ENCOUNTER — TRANSCRIPTION ENCOUNTER (OUTPATIENT)
Age: 20
End: 2025-06-25

## 2025-06-25 LAB
25(OH)D3 SERPL-MCNC: 26 NG/ML
ALBUMIN SERPL ELPH-MCNC: 4.2 G/DL
ALP BLD-CCNC: 52 U/L
ALT SERPL-CCNC: 17 U/L
ANION GAP SERPL CALC-SCNC: 13 MMOL/L
AST SERPL-CCNC: 21 U/L
BASOPHILS # BLD AUTO: 0.02 K/UL
BASOPHILS NFR BLD AUTO: 0.3 %
BILIRUB SERPL-MCNC: 0.4 MG/DL
BUN SERPL-MCNC: 12 MG/DL
CALCIUM SERPL-MCNC: 9.5 MG/DL
CHLORIDE SERPL-SCNC: 104 MMOL/L
CHOLEST SERPL-MCNC: 189 MG/DL
CO2 SERPL-SCNC: 21 MMOL/L
CREAT SERPL-MCNC: 0.61 MG/DL
EGFRCR SERPLBLD CKD-EPI 2021: 130 ML/MIN/1.73M2
EOSINOPHIL # BLD AUTO: 0.04 K/UL
EOSINOPHIL NFR BLD AUTO: 0.6 %
ESTIMATED AVERAGE GLUCOSE: 100 MG/DL
FOLATE SERPL-MCNC: 18.8 NG/ML
GLUCOSE SERPL-MCNC: 99 MG/DL
HBA1C MFR BLD HPLC: 5.1 %
HCT VFR BLD CALC: 37.9 %
HDLC SERPL-MCNC: 47 MG/DL
HGB BLD-MCNC: 12.5 G/DL
IMM GRANULOCYTES NFR BLD AUTO: 0.3 %
INSULIN P FAST SERPL-ACNC: 12.9 UU/ML
LDLC SERPL-MCNC: 111 MG/DL
LYMPHOCYTES # BLD AUTO: 2.55 K/UL
LYMPHOCYTES NFR BLD AUTO: 41.1 %
MAN DIFF?: NORMAL
MCHC RBC-ENTMCNC: 29.3 PG
MCHC RBC-ENTMCNC: 33 G/DL
MCV RBC AUTO: 89 FL
MONOCYTES # BLD AUTO: 0.45 K/UL
MONOCYTES NFR BLD AUTO: 7.3 %
NEUTROPHILS # BLD AUTO: 3.12 K/UL
NEUTROPHILS NFR BLD AUTO: 50.4 %
NONHDLC SERPL-MCNC: 142 MG/DL
PLATELET # BLD AUTO: 298 K/UL
POTASSIUM SERPL-SCNC: 4.5 MMOL/L
PROT SERPL-MCNC: 7.1 G/DL
RBC # BLD: 4.26 M/UL
RBC # FLD: 12.7 %
SODIUM SERPL-SCNC: 138 MMOL/L
TRIGL SERPL-MCNC: 182 MG/DL
VIT B12 SERPL-MCNC: 308 PG/ML
WBC # FLD AUTO: 6.2 K/UL

## 2025-07-08 LAB
CK SERPL-CCNC: 48 U/L
CRP SERPL-MCNC: <3 MG/L
ERYTHROCYTE [SEDIMENTATION RATE] IN BLOOD BY WESTERGREN METHOD: 13 MM/HR

## 2025-07-10 ENCOUNTER — NON-APPOINTMENT (OUTPATIENT)
Age: 20
End: 2025-07-10

## 2025-07-17 ENCOUNTER — APPOINTMENT (OUTPATIENT)
Dept: RHEUMATOLOGY | Facility: CLINIC | Age: 20
End: 2025-07-17

## 2025-07-17 VITALS
BODY MASS INDEX: 22.34 KG/M2 | TEMPERATURE: 97.5 F | WEIGHT: 139 LBS | OXYGEN SATURATION: 97 % | SYSTOLIC BLOOD PRESSURE: 104 MMHG | DIASTOLIC BLOOD PRESSURE: 60 MMHG | HEART RATE: 90 BPM | HEIGHT: 66 IN

## 2025-07-17 PROBLEM — M79.643 HAND PAIN: Status: ACTIVE | Noted: 2025-07-17

## 2025-07-17 PROBLEM — M25.50 HYPERMOBILITY ARTHRALGIA: Status: ACTIVE | Noted: 2025-07-17

## 2025-07-17 PROBLEM — M25.529 ELBOW PAIN: Status: ACTIVE | Noted: 2025-07-17

## 2025-07-17 PROCEDURE — G2211 COMPLEX E/M VISIT ADD ON: CPT | Mod: NC

## 2025-07-17 PROCEDURE — 99215 OFFICE O/P EST HI 40 MIN: CPT

## 2025-07-17 PROCEDURE — 99205 OFFICE O/P NEW HI 60 MIN: CPT

## 2025-07-18 PROBLEM — M25.561 BILATERAL KNEE PAIN: Status: ACTIVE | Noted: 2025-07-18

## 2025-07-22 ENCOUNTER — NON-APPOINTMENT (OUTPATIENT)
Age: 20
End: 2025-07-22

## 2025-07-22 ENCOUNTER — APPOINTMENT (OUTPATIENT)
Dept: ENDOCRINOLOGY | Facility: CLINIC | Age: 20
End: 2025-07-22
Payer: COMMERCIAL

## 2025-07-22 VITALS
BODY MASS INDEX: 22.18 KG/M2 | DIASTOLIC BLOOD PRESSURE: 70 MMHG | OXYGEN SATURATION: 98 % | SYSTOLIC BLOOD PRESSURE: 112 MMHG | HEART RATE: 79 BPM | WEIGHT: 138 LBS | HEIGHT: 66 IN

## 2025-07-22 DIAGNOSIS — E53.8 DEFICIENCY OF OTHER SPECIFIED B GROUP VITAMINS: ICD-10-CM

## 2025-07-22 DIAGNOSIS — E03.9 HYPOTHYROIDISM, UNSPECIFIED: ICD-10-CM

## 2025-07-22 DIAGNOSIS — E06.3 AUTOIMMUNE THYROIDITIS: ICD-10-CM

## 2025-07-22 DIAGNOSIS — R42 DIZZINESS AND GIDDINESS: ICD-10-CM

## 2025-07-22 PROCEDURE — G2211 COMPLEX E/M VISIT ADD ON: CPT | Mod: NC

## 2025-07-22 PROCEDURE — 99214 OFFICE O/P EST MOD 30 MIN: CPT

## 2025-07-25 ENCOUNTER — NON-APPOINTMENT (OUTPATIENT)
Age: 20
End: 2025-07-25

## 2025-07-31 ENCOUNTER — TRANSCRIPTION ENCOUNTER (OUTPATIENT)
Age: 20
End: 2025-07-31

## 2025-08-01 ENCOUNTER — TRANSCRIPTION ENCOUNTER (OUTPATIENT)
Age: 20
End: 2025-08-01

## 2025-08-13 ENCOUNTER — TRANSCRIPTION ENCOUNTER (OUTPATIENT)
Age: 20
End: 2025-08-13

## 2025-08-13 DIAGNOSIS — R79.89 OTHER SPECIFIED ABNORMAL FINDINGS OF BLOOD CHEMISTRY: ICD-10-CM

## 2025-08-13 DIAGNOSIS — Z13.1 ENCOUNTER FOR SCREENING FOR DIABETES MELLITUS: ICD-10-CM

## 2025-08-13 LAB
ACTH-ESO: 16 PG/ML
ADRENAL AB SER-ACNC: NEGATIVE
ALBUMIN SERPL ELPH-MCNC: 4 G/DL
ALP BLD-CCNC: 50 U/L
ALT SERPL-CCNC: 12 U/L
ANION GAP SERPL CALC-SCNC: 13 MMOL/L
AST SERPL-CCNC: 15 U/L
BILIRUB SERPL-MCNC: 0.3 MG/DL
BUN SERPL-MCNC: 10 MG/DL
CALCIUM SERPL-MCNC: 9.5 MG/DL
CHLORIDE SERPL-SCNC: 105 MMOL/L
CO2 SERPL-SCNC: 22 MMOL/L
CORTIS SERPL-MCNC: 35.7 UG/DL
CREAT SERPL-MCNC: 0.7 MG/DL
DHEA-SULFATE, SERUM: 98 UG/DL
EGFRCR SERPLBLD CKD-EPI 2021: 125 ML/MIN/1.73M2
GLUCOSE SERPL-MCNC: 104 MG/DL
POTASSIUM SERPL-SCNC: 4.3 MMOL/L
PROT SERPL-MCNC: 6.6 G/DL
SODIUM SERPL-SCNC: 139 MMOL/L
TSH SERPL-ACNC: 3.34 UIU/ML
VIT B12 SERPL-MCNC: 291 PG/ML

## 2025-08-22 ENCOUNTER — APPOINTMENT (OUTPATIENT)
Dept: OBGYN | Facility: CLINIC | Age: 20
End: 2025-08-22
Payer: COMMERCIAL

## 2025-08-22 VITALS
HEIGHT: 66 IN | SYSTOLIC BLOOD PRESSURE: 100 MMHG | WEIGHT: 138 LBS | BODY MASS INDEX: 22.18 KG/M2 | DIASTOLIC BLOOD PRESSURE: 60 MMHG

## 2025-08-22 DIAGNOSIS — Z01.419 ENCOUNTER FOR GYNECOLOGICAL EXAMINATION (GENERAL) (ROUTINE) W/OUT ABNORMAL FINDINGS: ICD-10-CM

## 2025-08-22 DIAGNOSIS — N76.0 ACUTE VAGINITIS: ICD-10-CM

## 2025-08-22 DIAGNOSIS — Z87.42 PERSONAL HISTORY OF OTHER DISEASES OF THE FEMALE GENITAL TRACT: ICD-10-CM

## 2025-08-22 PROCEDURE — 99395 PREV VISIT EST AGE 18-39: CPT

## 2025-08-25 LAB
BV BACTERIA RRNA VAG QL NAA+PROBE: NOT DETECTED
C GLABRATA RNA VAG QL NAA+PROBE: NOT DETECTED
C TRACH RRNA SPEC QL NAA+PROBE: NOT DETECTED
CANDIDA RRNA VAG QL PROBE: NOT DETECTED
N GONORRHOEA RRNA SPEC QL NAA+PROBE: NOT DETECTED
T VAGINALIS RRNA SPEC QL NAA+PROBE: NOT DETECTED

## 2025-08-27 LAB — CYTOLOGY CVX/VAG DOC THIN PREP: NORMAL

## 2025-09-05 ENCOUNTER — RX RENEWAL (OUTPATIENT)
Age: 20
End: 2025-09-05